# Patient Record
Sex: FEMALE | Race: WHITE | Employment: FULL TIME | URBAN - METROPOLITAN AREA
[De-identification: names, ages, dates, MRNs, and addresses within clinical notes are randomized per-mention and may not be internally consistent; named-entity substitution may affect disease eponyms.]

---

## 2017-10-07 PROCEDURE — 87529 HSV DNA AMP PROBE: CPT | Performed by: INTERNAL MEDICINE

## 2017-10-07 PROCEDURE — 36415 COLL VENOUS BLD VENIPUNCTURE: CPT | Performed by: INTERNAL MEDICINE

## 2018-10-29 PROCEDURE — 81003 URINALYSIS AUTO W/O SCOPE: CPT | Performed by: INTERNAL MEDICINE

## 2024-02-07 ENCOUNTER — APPOINTMENT (OUTPATIENT)
Dept: GENERAL RADIOLOGY | Facility: HOSPITAL | Age: 64
End: 2024-02-07
Attending: EMERGENCY MEDICINE
Payer: COMMERCIAL

## 2024-02-07 ENCOUNTER — HOSPITAL ENCOUNTER (EMERGENCY)
Facility: HOSPITAL | Age: 64
Discharge: HOME | End: 2024-02-07
Attending: EMERGENCY MEDICINE
Payer: COMMERCIAL

## 2024-02-07 VITALS
RESPIRATION RATE: 10 BRPM | WEIGHT: 145 LBS | TEMPERATURE: 98 F | BODY MASS INDEX: 24.75 KG/M2 | HEIGHT: 64 IN | HEART RATE: 48 BPM | OXYGEN SATURATION: 99 % | DIASTOLIC BLOOD PRESSURE: 68 MMHG | SYSTOLIC BLOOD PRESSURE: 120 MMHG

## 2024-02-07 DIAGNOSIS — R55 SYNCOPE AND COLLAPSE: Primary | ICD-10-CM

## 2024-02-07 LAB
ALBUMIN SERPL-MCNC: 4.1 G/DL (ref 3.2–4.8)
ALBUMIN/GLOB SERPL: 1.5 {RATIO} (ref 1–2)
ALP LIVER SERPL-CCNC: 34 U/L
ALT SERPL-CCNC: 20 U/L
ANION GAP SERPL CALC-SCNC: 10 MMOL/L (ref 0–18)
AST SERPL-CCNC: 24 U/L (ref ?–34)
ATRIAL RATE: 54 BPM
BASOPHILS # BLD AUTO: 0.03 X10(3) UL (ref 0–0.2)
BASOPHILS NFR BLD AUTO: 0.5 %
BILIRUB SERPL-MCNC: 0.9 MG/DL (ref 0.2–1.1)
BUN BLD-MCNC: 25 MG/DL (ref 9–23)
BUN/CREAT SERPL: 21 (ref 10–20)
CALCIUM BLD-MCNC: 9.1 MG/DL (ref 8.7–10.4)
CHLORIDE SERPL-SCNC: 104 MMOL/L (ref 98–112)
CO2 SERPL-SCNC: 22 MMOL/L (ref 21–32)
CREAT BLD-MCNC: 1.19 MG/DL
DEPRECATED RDW RBC AUTO: 43.3 FL (ref 35.1–46.3)
EGFRCR SERPLBLD CKD-EPI 2021: 51 ML/MIN/1.73M2 (ref 60–?)
EOSINOPHIL # BLD AUTO: 0.15 X10(3) UL (ref 0–0.7)
EOSINOPHIL NFR BLD AUTO: 2.7 %
ERYTHROCYTE [DISTWIDTH] IN BLOOD BY AUTOMATED COUNT: 12.9 % (ref 11–15)
GLOBULIN PLAS-MCNC: 2.7 G/DL (ref 2.8–4.4)
GLUCOSE BLD-MCNC: 139 MG/DL (ref 70–99)
GLUCOSE BLDC GLUCOMTR-MCNC: 122 MG/DL (ref 70–99)
HCT VFR BLD AUTO: 32.7 %
HGB BLD-MCNC: 11.5 G/DL
IMM GRANULOCYTES # BLD AUTO: 0.02 X10(3) UL (ref 0–1)
IMM GRANULOCYTES NFR BLD: 0.4 %
LYMPHOCYTES # BLD AUTO: 1.39 X10(3) UL (ref 1–4)
LYMPHOCYTES NFR BLD AUTO: 25.3 %
MCH RBC QN AUTO: 32 PG (ref 26–34)
MCHC RBC AUTO-ENTMCNC: 35.2 G/DL (ref 31–37)
MCV RBC AUTO: 91.1 FL
MONOCYTES # BLD AUTO: 0.31 X10(3) UL (ref 0.1–1)
MONOCYTES NFR BLD AUTO: 5.6 %
NEUTROPHILS # BLD AUTO: 3.6 X10 (3) UL (ref 1.5–7.7)
NEUTROPHILS # BLD AUTO: 3.6 X10(3) UL (ref 1.5–7.7)
NEUTROPHILS NFR BLD AUTO: 65.5 %
OSMOLALITY SERPL CALC.SUM OF ELEC: 289 MOSM/KG (ref 275–295)
P-R INTERVAL: 144 MS
PLATELET # BLD AUTO: 170 10(3)UL (ref 150–450)
POTASSIUM SERPL-SCNC: 3.8 MMOL/L (ref 3.5–5.1)
PROT SERPL-MCNC: 6.8 G/DL (ref 5.7–8.2)
Q-T INTERVAL: 466 MS
QRS DURATION: 92 MS
QTC CALCULATION (BEZET): 441 MS
R AXIS: 172 DEGREES
RBC # BLD AUTO: 3.59 X10(6)UL
SODIUM SERPL-SCNC: 136 MMOL/L (ref 136–145)
T AXIS: 177 DEGREES
TROPONIN I SERPL HS-MCNC: 5 NG/L
TROPONIN I SERPL HS-MCNC: 9 NG/L
VENTRICULAR RATE: 54 BPM
WBC # BLD AUTO: 5.5 X10(3) UL (ref 4–11)

## 2024-02-07 PROCEDURE — 96360 HYDRATION IV INFUSION INIT: CPT

## 2024-02-07 PROCEDURE — 82962 GLUCOSE BLOOD TEST: CPT

## 2024-02-07 PROCEDURE — 80053 COMPREHEN METABOLIC PANEL: CPT | Performed by: EMERGENCY MEDICINE

## 2024-02-07 PROCEDURE — 84484 ASSAY OF TROPONIN QUANT: CPT | Performed by: EMERGENCY MEDICINE

## 2024-02-07 PROCEDURE — 99285 EMERGENCY DEPT VISIT HI MDM: CPT

## 2024-02-07 PROCEDURE — 71045 X-RAY EXAM CHEST 1 VIEW: CPT | Performed by: EMERGENCY MEDICINE

## 2024-02-07 PROCEDURE — 93005 ELECTROCARDIOGRAM TRACING: CPT

## 2024-02-07 PROCEDURE — 96361 HYDRATE IV INFUSION ADD-ON: CPT

## 2024-02-07 PROCEDURE — 93010 ELECTROCARDIOGRAM REPORT: CPT

## 2024-02-07 PROCEDURE — 85025 COMPLETE CBC W/AUTO DIFF WBC: CPT | Performed by: EMERGENCY MEDICINE

## 2024-02-07 NOTE — ED PROVIDER NOTES
Patient Seen in: [facility] Emergency Department      History     Chief Complaint   Patient presents with    Syncope     Stated Complaint: Syncope    Subjective:   HPI    63-year-old relatively healthy female who presents for evaluation of a syncopal episode at the gym today.  She works out daily.  She states her workout seemed little harder than usual this morning.  She got off and was doing weights and on the second machine she got a little lightheaded and then woke up on the floor somewhat helping her.  She was placed in a collar by EMS.  She denies any pain at this time.  No established cardiac history.  No recent travel history of blood clots.  Per EMS heart rate was in the 50s and initial blood pressure was in the 60s.  Improved with fluids and time.    Objective:   Past Medical History:   Diagnosis Date    ALLERGIC RHINITIS     BACK PAIN 08    c6-7 disc herniation    HYPERLIPIDEMIA     HYPERTENSION 11    Osteoarthritis     OTHER DISEASES     icthiosis              Past Surgical History:   Procedure Laterality Date                      Social History     Socioeconomic History    Marital status:    Occupational History    Occupation: [employer]   Tobacco Use    Smoking status: Never    Smokeless tobacco: Never   Vaping Use    Vaping Use: Never used   Substance and Sexual Activity    Alcohol use: Yes     Alcohol/week: 2.0 standard drinks of alcohol     Types: 2 Cans of beer per week     Comment: < 8 oz a week    Drug use: No    Sexual activity: Yes     Partners: Male     Comment:    Other Topics Concern     Service No    Blood Transfusions No    Caffeine Concern No    Occupational Exposure No    Hobby Hazards No    Sleep Concern No    Stress Concern Yes     Comment: work    Weight Concern No    Special Diet Yes     Comment: balanced    Back Care Yes    Exercise Yes     Comment: treadmill 4x/week     Seat Belt Yes    Self-Exams Yes              Review of  Systems    Positive for stated complaint: Syncope  Other systems are as noted in HPI.  Constitutional and vital signs reviewed.      All other systems reviewed and negative except as noted above.    Physical Exam     ED Triage Vitals   BP 02/07/24 0735 108/60   Pulse 02/07/24 0735 54   Resp 02/07/24 0735 15   Temp 02/07/24 0735 98 °F (36.7 °C)   Temp src 02/07/24 0735 Temporal   SpO2 02/07/24 0735 100 %   O2 Device 02/07/24 0741 None (Room air)       Current:/68   Pulse (!) 48   Temp 98 °F (36.7 °C) (Temporal)   Resp 10   Ht 162.6 cm (5' 4\")   Wt 65.8 kg   LMP 01/14/2008   SpO2 99%   BMI 24.89 kg/m²         Physical Exam    Constitutional: Oriented to person, place, and time.  Appears well-developed. No distress.   Head: Normocephalic and atraumatic.   Eyes: Conjunctivae are normal. Pupils are equal, round, and reactive to light.   Neck: Normal range of motion. Neck supple.  No pain posteriorly.  Cardiovascular: Heart rate around 56-58, sinus, regular rhythm and intact and equal distal pulses.    Pulmonary/Chest: Effort normal. No respiratory distress.  No audible wheeze or crackles  Abdominal: Soft. There is no tenderness. There is no guarding.   Musculoskeletal: Normal range of motion. No edema or tenderness.   Neurological: Alert and oriented to person, place, and time.  No gross deficits  Skin: Skin is warm and dry.   Psychiatric: Normal mood and affect.  Behavior is normal.   Nursing note and vitals reviewed.    Differential diagnosis includes vasovagal event and symptomatic bradycardia, less likely ACS, electrolyte abnormality, dehydration.      ED Course     Labs Reviewed   COMP METABOLIC PANEL (14) - Abnormal; Notable for the following components:       Result Value    Glucose 139 (*)     BUN 25 (*)     Creatinine 1.19 (*)     BUN/CREA Ratio 21.0 (*)     eGFR-Cr 51 (*)     Alkaline Phosphatase 34 (*)     Globulin  2.7 (*)     All other components within normal limits   POCT GLUCOSE - Abnormal;  Notable for the following components:    POC Glucose  122 (*)     All other components within normal limits   CBC W/ DIFFERENTIAL - Abnormal; Notable for the following components:    RBC 3.59 (*)     HGB 11.5 (*)     HCT 32.7 (*)     All other components within normal limits   TROPONIN I HIGH SENSITIVITY - Normal   TROPONIN I HIGH SENSITIVITY - Normal   CBC WITH DIFFERENTIAL WITH PLATELET    Narrative:     The following orders were created for panel order CBC With Differential With Platelet.  Procedure                               Abnormality         Status                     ---------                               -----------         ------                     CBC W/ DIFFERENTIAL[[id number]]          Abnormal            Final result                 Please view results for these tests on the individual orders.     EKG    Rate, intervals and axes as noted on EKG Report.  Rate: 54  Rhythm: Sinus Rhythm  Reading: No acute ischemic changes, no leads II and aVF only slightly abnormal T waves but lead III appears normal                 XR CHEST AP PORTABLE  (CPT=71045)    Result Date: 2/7/2024  PROCEDURE: XR CHEST AP PORTABLE  (CPT=71045) TIME: 801  COMPARISON: None.  INDICATIONS: Syncope today. History of essential hypertension  TECHNIQUE:   Single view.   FINDINGS: Normal heart size, clear lungs, normal pleura         CONCLUSION: Normal    Dictated by (CST): Fred Patel MD on 2/07/2024 at 8:18 AM     Finalized by (CST): Fred Patel MD on 2/07/2024 at 8:18 AM                  [facility]                                         Medical Decision Making  Patient feeling fine here.  Vitals normal.  She is unsure of what her resting heart rate is but suspect likely low given she works out daily.  Troponin x 2 negative without acute ischemic changes on her EKG.  She got up and felt fine and walked here without symptoms.  Her  is here with her.  I see no indication for admission to the hospital.  Tylenol for pain.  She  should continue her regular prescribed indications.  She does need to call make a follow-up appointment with her doctor.  She will come back before with worsening or change.    Problems Addressed:  Syncope and collapse: acute illness or injury with systemic symptoms    Amount and/or Complexity of Data Reviewed  External Data Reviewed: ECG.     Details: October 29, 2018 EKG reviewed in the chart.  Patient had similar T wave changes in lead III isolated again at that time only  Labs: ordered. Decision-making details documented in ED Course.  Radiology: ordered and independent interpretation performed. Decision-making details documented in ED Course.     Details:  by my review there is no obvious evidence of pulmonary edema, pleural effusion, pneumothorax or focal infiltrate on x-ray imaging.  ECG/medicine tests: ordered and independent interpretation performed. Decision-making details documented in ED Course.    Risk  OTC drugs.  Prescription drug management.  Decision regarding hospitalization.        Disposition and Plan     Clinical Impression:  1. Syncope and collapse         Disposition:  Discharge  2/7/2024 10:12 am    Follow-up:  Ebony Guardado MD  [address]  [address] [place] [address]  [phone]    Call            Medications Prescribed:  Current Discharge Medication List

## 2024-02-07 NOTE — ED INITIAL ASSESSMENT (HPI)
Pt to ED via EMS for syncopal episode at her gym. Patient was performing arm exercises and supposedly passed out. Bystander found patient on the floor and aroused her. Upon EMS arrival patient was bradycardic and hypotensive. Unsure if how long pt was unconscious. Unsure if she hit her head. Not on thinners. Arrives A/Ox4. C-collar applied on scene by EMS.